# Patient Record
Sex: FEMALE | Race: WHITE | Employment: OTHER | URBAN - METROPOLITAN AREA
[De-identification: names, ages, dates, MRNs, and addresses within clinical notes are randomized per-mention and may not be internally consistent; named-entity substitution may affect disease eponyms.]

---

## 2017-03-20 LAB
AVERAGE GLUCOSE: NORMAL
HBA1C MFR BLD: 6.1 %

## 2017-07-19 LAB
AVERAGE GLUCOSE: NORMAL
HBA1C MFR BLD: 6.3 %

## 2018-01-01 ENCOUNTER — OFFICE VISIT (OUTPATIENT)
Dept: PRIMARY CARE CLINIC | Age: 83
End: 2018-01-01
Payer: MEDICARE

## 2018-01-01 ENCOUNTER — NURSE ONLY (OUTPATIENT)
Dept: PRIMARY CARE CLINIC | Age: 83
End: 2018-01-01

## 2018-01-01 ENCOUNTER — CARE COORDINATION (OUTPATIENT)
Dept: CARE COORDINATION | Age: 83
End: 2018-01-01

## 2018-01-01 ENCOUNTER — TELEPHONE (OUTPATIENT)
Dept: PRIMARY CARE CLINIC | Age: 83
End: 2018-01-01

## 2018-01-01 ENCOUNTER — CARE COORDINATION (OUTPATIENT)
Dept: CASE MANAGEMENT | Age: 83
End: 2018-01-01

## 2018-01-01 ENCOUNTER — HOSPITAL ENCOUNTER (OUTPATIENT)
Age: 83
Discharge: HOME | End: 2018-10-05
Payer: MEDICARE

## 2018-01-01 ENCOUNTER — HOSPITAL ENCOUNTER (INPATIENT)
Age: 83
LOS: 2 days | Discharge: HOME | DRG: 189 | End: 2018-12-14
Attending: EMERGENCY MEDICINE | Admitting: INTERNAL MEDICINE
Payer: MEDICARE

## 2018-01-01 ENCOUNTER — HOSPITAL ENCOUNTER (OUTPATIENT)
Age: 83
Discharge: HOME | End: 2018-03-29
Payer: MEDICARE

## 2018-01-01 ENCOUNTER — HOSPITAL ENCOUNTER (OUTPATIENT)
Age: 83
Discharge: HOME | End: 2018-09-29
Payer: MEDICARE

## 2018-01-01 ENCOUNTER — APPOINTMENT (OUTPATIENT)
Dept: GENERAL RADIOLOGY | Age: 83
DRG: 189 | End: 2018-01-01
Payer: MEDICARE

## 2018-01-01 VITALS
WEIGHT: 97 LBS | HEIGHT: 58 IN | DIASTOLIC BLOOD PRESSURE: 60 MMHG | HEART RATE: 88 BPM | BODY MASS INDEX: 20.36 KG/M2 | RESPIRATION RATE: 16 BRPM | OXYGEN SATURATION: 98 % | TEMPERATURE: 97 F | SYSTOLIC BLOOD PRESSURE: 114 MMHG

## 2018-01-01 VITALS
TEMPERATURE: 97.4 F | DIASTOLIC BLOOD PRESSURE: 44 MMHG | WEIGHT: 102 LBS | RESPIRATION RATE: 18 BRPM | HEIGHT: 62 IN | SYSTOLIC BLOOD PRESSURE: 93 MMHG | HEART RATE: 90 BPM | OXYGEN SATURATION: 96 % | BODY MASS INDEX: 18.77 KG/M2

## 2018-01-01 VITALS
BODY MASS INDEX: 19.96 KG/M2 | TEMPERATURE: 98.2 F | DIASTOLIC BLOOD PRESSURE: 52 MMHG | WEIGHT: 99 LBS | HEIGHT: 59 IN | OXYGEN SATURATION: 89 % | HEART RATE: 75 BPM | SYSTOLIC BLOOD PRESSURE: 110 MMHG | RESPIRATION RATE: 18 BRPM

## 2018-01-01 DIAGNOSIS — E78.49 OTHER HYPERLIPIDEMIA: ICD-10-CM

## 2018-01-01 DIAGNOSIS — R53.83 FATIGUE, UNSPECIFIED TYPE: Primary | ICD-10-CM

## 2018-01-01 DIAGNOSIS — I25.10 ATHEROSCLEROSIS OF NATIVE CORONARY ARTERY WITHOUT ANGINA PECTORIS, UNSPECIFIED WHETHER NATIVE OR TRANSPLANTED HEART: ICD-10-CM

## 2018-01-01 DIAGNOSIS — D63.1 ANEMIA IN STAGE 3 CHRONIC KIDNEY DISEASE (HCC): ICD-10-CM

## 2018-01-01 DIAGNOSIS — E55.9 VITAMIN D DEFICIENCY: ICD-10-CM

## 2018-01-01 DIAGNOSIS — N18.30 ANEMIA IN STAGE 3 CHRONIC KIDNEY DISEASE (HCC): ICD-10-CM

## 2018-01-01 DIAGNOSIS — R60.0 BILATERAL LOWER EXTREMITY EDEMA: ICD-10-CM

## 2018-01-01 DIAGNOSIS — J98.4 CHRONIC LUNG DISEASE: ICD-10-CM

## 2018-01-01 DIAGNOSIS — R53.82 CHRONIC FATIGUE: ICD-10-CM

## 2018-01-01 DIAGNOSIS — D69.6 DECREASED PLATELET COUNT (HCC): ICD-10-CM

## 2018-01-01 DIAGNOSIS — M89.9 DISORDER OF BONE: ICD-10-CM

## 2018-01-01 DIAGNOSIS — J96.01 ACUTE RESPIRATORY FAILURE WITH HYPOXIA (HCC): ICD-10-CM

## 2018-01-01 DIAGNOSIS — R53.83 FATIGUE, UNSPECIFIED TYPE: ICD-10-CM

## 2018-01-01 DIAGNOSIS — I50.32 CHRONIC DIASTOLIC CHF (CONGESTIVE HEART FAILURE) (HCC): Primary | ICD-10-CM

## 2018-01-01 DIAGNOSIS — Z79.899 HIGH RISK MEDICATION USE: ICD-10-CM

## 2018-01-01 DIAGNOSIS — I35.0 NONRHEUMATIC AORTIC VALVE STENOSIS: Primary | ICD-10-CM

## 2018-01-01 DIAGNOSIS — E55.9 VITAMIN D DEFICIENCY, UNSPECIFIED: ICD-10-CM

## 2018-01-01 DIAGNOSIS — I50.33 ACUTE ON CHRONIC DIASTOLIC CONGESTIVE HEART FAILURE (HCC): Primary | ICD-10-CM

## 2018-01-01 DIAGNOSIS — I35.0 NONRHEUMATIC AORTIC VALVE STENOSIS: ICD-10-CM

## 2018-01-01 DIAGNOSIS — R73.09 ELEVATED HEMOGLOBIN A1C: ICD-10-CM

## 2018-01-01 DIAGNOSIS — D51.0 PERNICIOUS ANEMIA: ICD-10-CM

## 2018-01-01 DIAGNOSIS — Z71.85 IMMUNIZATION COUNSELING: ICD-10-CM

## 2018-01-01 DIAGNOSIS — R06.09 DYSPNEA ON EXERTION: ICD-10-CM

## 2018-01-01 DIAGNOSIS — I50.32 CHRONIC DIASTOLIC CHF (CONGESTIVE HEART FAILURE) (HCC): ICD-10-CM

## 2018-01-01 DIAGNOSIS — I35.0 AORTIC VALVE STENOSIS, ETIOLOGY OF CARDIAC VALVE DISEASE UNSPECIFIED: ICD-10-CM

## 2018-01-01 DIAGNOSIS — E11.8 TYPE 2 DIABETES MELLITUS WITH COMPLICATION, UNSPECIFIED LONG TERM INSULIN USE STATUS: ICD-10-CM

## 2018-01-01 DIAGNOSIS — Z23 NEED FOR PROPHYLACTIC VACCINATION AGAINST STREPTOCOCCUS PNEUMONIAE (PNEUMOCOCCUS): Primary | ICD-10-CM

## 2018-01-01 DIAGNOSIS — I27.20 PULMONARY HYPERTENSION (HCC): ICD-10-CM

## 2018-01-01 DIAGNOSIS — S81.812A NONINFECTED SKIN TEAR OF LEFT LOWER EXTREMITY, INITIAL ENCOUNTER: ICD-10-CM

## 2018-01-01 LAB
ALBUMIN SERPL-MCNC: 3.5 G/DL (ref 3.5–5.2)
ALBUMIN SERPL-MCNC: 4 G/DL (ref 3.5–5.2)
ALBUMIN SERPL-MCNC: 4.3 G/DL (ref 3.5–5.2)
ALBUMIN SERPL-MCNC: 4.3 G/DL (ref 3.5–5.2)
ALP BLD-CCNC: 56 U/L (ref 35–104)
ALP BLD-CCNC: 58 U/L (ref 35–104)
ALP BLD-CCNC: 64 U/L (ref 35–104)
ALP BLD-CCNC: 65 U/L (ref 35–104)
ALT SERPL-CCNC: 6 U/L (ref 0–32)
ALT SERPL-CCNC: 6 U/L (ref 0–32)
ALT SERPL-CCNC: 8 U/L (ref 0–32)
ALT SERPL-CCNC: 9 U/L (ref 0–32)
ANION GAP SERPL CALCULATED.3IONS-SCNC: 15 MMOL/L (ref 7–16)
ANION GAP SERPL CALCULATED.3IONS-SCNC: 15 MMOL/L (ref 7–16)
ANION GAP SERPL CALCULATED.3IONS-SCNC: 3 MMOL/L (ref 7–16)
ANION GAP SERPL CALCULATED.3IONS-SCNC: 7 MMOL/L (ref 7–16)
ANISOCYTOSIS: ABNORMAL
AST SERPL-CCNC: 15 U/L (ref 0–31)
AST SERPL-CCNC: 16 U/L (ref 0–31)
AST SERPL-CCNC: 17 U/L (ref 0–31)
AST SERPL-CCNC: 24 U/L (ref 0–31)
B.E.: 12.3 MMOL/L (ref -3–3)
BACTERIA: ABNORMAL /HPF
BACTERIA: ABNORMAL /HPF
BASOPHILIC STIPPLING: ABNORMAL
BASOPHILS ABSOLUTE: 0 E9/L (ref 0–0.2)
BASOPHILS ABSOLUTE: 0.02 E9/L (ref 0–0.2)
BASOPHILS ABSOLUTE: 0.02 E9/L (ref 0–0.2)
BASOPHILS RELATIVE PERCENT: 0 % (ref 0–2)
BASOPHILS RELATIVE PERCENT: 0.2 % (ref 0–2)
BASOPHILS RELATIVE PERCENT: 0.3 % (ref 0–2)
BILIRUB SERPL-MCNC: 0.5 MG/DL (ref 0–1.2)
BILIRUB SERPL-MCNC: 0.5 MG/DL (ref 0–1.2)
BILIRUB SERPL-MCNC: 0.6 MG/DL (ref 0–1.2)
BILIRUB SERPL-MCNC: 0.7 MG/DL (ref 0–1.2)
BILIRUBIN URINE: NEGATIVE
BILIRUBIN URINE: NEGATIVE
BLOOD, URINE: NEGATIVE
BLOOD, URINE: NEGATIVE
BUN BLDV-MCNC: 23 MG/DL (ref 8–23)
BUN BLDV-MCNC: 25 MG/DL (ref 8–23)
BUN BLDV-MCNC: 26 MG/DL (ref 8–23)
BUN BLDV-MCNC: 27 MG/DL (ref 8–23)
CALCIUM SERPL-MCNC: 8 MG/DL (ref 8.6–10.2)
CALCIUM SERPL-MCNC: 8.7 MG/DL (ref 8.6–10.2)
CALCIUM SERPL-MCNC: 8.7 MG/DL (ref 8.6–10.2)
CALCIUM SERPL-MCNC: 9 MG/DL (ref 8.6–10.2)
CHLORIDE BLD-SCNC: 93 MMOL/L (ref 98–107)
CHLORIDE BLD-SCNC: 93 MMOL/L (ref 98–107)
CHLORIDE BLD-SCNC: 95 MMOL/L (ref 98–107)
CHLORIDE BLD-SCNC: 96 MMOL/L (ref 98–107)
CHOLESTEROL, TOTAL: 133 MG/DL (ref 0–199)
CHOLESTEROL, TOTAL: 171 MG/DL (ref 0–199)
CHOLESTEROL, TOTAL: 177 MG/DL (ref 0–199)
CLARITY: CLEAR
CLARITY: CLEAR
CO2: 33 MMOL/L (ref 22–29)
CO2: 34 MMOL/L (ref 22–29)
CO2: 40 MMOL/L (ref 22–29)
CO2: 41 MMOL/L (ref 22–29)
COHB: 1.9 % (ref 0–1.5)
COLOR: ABNORMAL
COLOR: YELLOW
CREAT SERPL-MCNC: 0.8 MG/DL (ref 0.5–1)
CREAT SERPL-MCNC: 0.8 MG/DL (ref 0.5–1)
CREAT SERPL-MCNC: 0.9 MG/DL (ref 0.5–1)
CREAT SERPL-MCNC: 1 MG/DL (ref 0.5–1)
CRITICAL: ABNORMAL
D DIMER: 202 NG/ML DDU
DATE ANALYZED: ABNORMAL
DATE OF COLLECTION: ABNORMAL
EOSINOPHILS ABSOLUTE: 0.08 E9/L (ref 0.05–0.5)
EOSINOPHILS ABSOLUTE: 0.11 E9/L (ref 0.05–0.5)
EOSINOPHILS ABSOLUTE: 0.12 E9/L (ref 0.05–0.5)
EOSINOPHILS RELATIVE PERCENT: 1 % (ref 0–6)
EOSINOPHILS RELATIVE PERCENT: 1.5 % (ref 0–6)
EOSINOPHILS RELATIVE PERCENT: 1.7 % (ref 0–6)
EPITHELIAL CELLS, UA: ABNORMAL /HPF
FILM ARRAY ADENOVIRUS: NORMAL
FILM ARRAY BORDETELLA PERTUSSIS: NORMAL
FILM ARRAY CHLAMYDOPHILIA PNEUMONIAE: NORMAL
FILM ARRAY CORONAVIRUS 229E: NORMAL
FILM ARRAY CORONAVIRUS HKU1: NORMAL
FILM ARRAY CORONAVIRUS NL63: NORMAL
FILM ARRAY CORONAVIRUS OC43: NORMAL
FILM ARRAY INFLUENZA A VIRUS 09H1: NORMAL
FILM ARRAY INFLUENZA A VIRUS H1: NORMAL
FILM ARRAY INFLUENZA A VIRUS H3: NORMAL
FILM ARRAY INFLUENZA A VIRUS: NORMAL
FILM ARRAY INFLUENZA B: NORMAL
FILM ARRAY METAPNEUMOVIRUS: NORMAL
FILM ARRAY MYCOPLASMA PNEUMONIAE: NORMAL
FILM ARRAY PARAINFLUENZA VIRUS 1: NORMAL
FILM ARRAY PARAINFLUENZA VIRUS 2: NORMAL
FILM ARRAY PARAINFLUENZA VIRUS 3: NORMAL
FILM ARRAY PARAINFLUENZA VIRUS 4: NORMAL
FILM ARRAY RESPIRATORY SYNCITIAL VIRUS: NORMAL
FILM ARRAY RHINOVIRUS/ENTEROVIRUS: NORMAL
FOLATE: 18.2 NG/ML (ref 4.8–24.2)
GFR AFRICAN AMERICAN: >60
GFR NON-AFRICAN AMERICAN: 52 ML/MIN/1.73
GFR NON-AFRICAN AMERICAN: 59 ML/MIN/1.73
GFR NON-AFRICAN AMERICAN: >60 ML/MIN/1.73
GFR NON-AFRICAN AMERICAN: >60 ML/MIN/1.73
GLUCOSE BLD-MCNC: 182 MG/DL (ref 74–109)
GLUCOSE BLD-MCNC: 288 MG/DL (ref 74–109)
GLUCOSE BLD-MCNC: 54 MG/DL (ref 74–99)
GLUCOSE BLD-MCNC: 61 MG/DL (ref 74–99)
GLUCOSE URINE: NEGATIVE MG/DL
GLUCOSE URINE: NEGATIVE MG/DL
HBA1C MFR BLD: 5 % (ref 4–5.6)
HBA1C MFR BLD: 6 % (ref 4–5.6)
HBA1C MFR BLD: 6.1 % (ref 4.8–5.9)
HCO3: 40.4 MMOL/L (ref 22–26)
HCT VFR BLD CALC: 30.7 % (ref 34–48)
HCT VFR BLD CALC: 32.9 % (ref 34–48)
HCT VFR BLD CALC: 35.3 % (ref 34–48)
HCT VFR BLD CALC: 36.1 % (ref 34–48)
HDLC SERPL-MCNC: 40 MG/DL
HDLC SERPL-MCNC: 48 MG/DL
HDLC SERPL-MCNC: 53 MG/DL
HEMOGLOBIN: 10 G/DL (ref 11.5–15.5)
HEMOGLOBIN: 8.3 G/DL (ref 11.5–15.5)
HEMOGLOBIN: 8.9 G/DL (ref 11.5–15.5)
HEMOGLOBIN: 9.6 G/DL (ref 11.5–15.5)
HHB: 11 % (ref 0–5)
HYPOCHROMIA: ABNORMAL
HYPOCHROMIA: ABNORMAL
IMMATURE GRANULOCYTES #: 0.04 E9/L
IMMATURE GRANULOCYTES #: 0.06 E9/L
IMMATURE GRANULOCYTES %: 0.6 % (ref 0–5)
IMMATURE GRANULOCYTES %: 0.7 % (ref 0–5)
KETONES, URINE: NEGATIVE MG/DL
KETONES, URINE: NEGATIVE MG/DL
LAB: ABNORMAL
LACTIC ACID, SEPSIS: 1.8 MMOL/L (ref 0.5–1.9)
LACTIC ACID: 0.7 MMOL/L (ref 0.5–2.2)
LDL CHOLESTEROL CALCULATED: 100 MG/DL (ref 0–99)
LDL CHOLESTEROL CALCULATED: 106 MG/DL (ref 0–99)
LDL CHOLESTEROL CALCULATED: 80 MG/DL (ref 0–99)
LEUKOCYTE ESTERASE, URINE: ABNORMAL
LEUKOCYTE ESTERASE, URINE: NEGATIVE
LV EF: 59 %
LVEF MODALITY: NORMAL
LYMPHOCYTES ABSOLUTE: 2.47 E9/L (ref 1.5–4)
LYMPHOCYTES ABSOLUTE: 2.53 E9/L (ref 1.5–4)
LYMPHOCYTES ABSOLUTE: 2.67 E9/L (ref 1.5–4)
LYMPHOCYTES RELATIVE PERCENT: 31.4 % (ref 20–42)
LYMPHOCYTES RELATIVE PERCENT: 33 % (ref 20–42)
LYMPHOCYTES RELATIVE PERCENT: 39.1 % (ref 20–42)
Lab: ABNORMAL
MAGNESIUM: 2.4 MG/DL (ref 1.6–2.6)
MCH RBC QN AUTO: 18.1 PG (ref 26–35)
MCH RBC QN AUTO: 18.3 PG (ref 26–35)
MCH RBC QN AUTO: 18.7 PG (ref 26–35)
MCH RBC QN AUTO: 18.7 PG (ref 26–35)
MCHC RBC AUTO-ENTMCNC: 27 % (ref 32–34.5)
MCHC RBC AUTO-ENTMCNC: 27.1 % (ref 32–34.5)
MCHC RBC AUTO-ENTMCNC: 27.2 % (ref 32–34.5)
MCHC RBC AUTO-ENTMCNC: 27.7 % (ref 32–34.5)
MCV RBC AUTO: 66 FL (ref 80–99.9)
MCV RBC AUTO: 66.5 FL (ref 80–99.9)
MCV RBC AUTO: 69 FL (ref 80–99.9)
MCV RBC AUTO: 69.1 FL (ref 80–99.9)
METER GLUCOSE: 117 MG/DL (ref 74–99)
METER GLUCOSE: 122 MG/DL (ref 74–99)
METER GLUCOSE: 145 MG/DL (ref 74–99)
METER GLUCOSE: 202 MG/DL (ref 74–99)
METER GLUCOSE: 211 MG/DL (ref 74–99)
METER GLUCOSE: 220 MG/DL (ref 74–99)
METER GLUCOSE: 73 MG/DL (ref 74–99)
METER GLUCOSE: 82 MG/DL (ref 74–99)
METER GLUCOSE: 87 MG/DL (ref 74–99)
METHB: 0.2 % (ref 0–1.5)
MODE: ABNORMAL
MONOCYTES ABSOLUTE: 0.45 E9/L (ref 0.1–0.95)
MONOCYTES ABSOLUTE: 0.45 E9/L (ref 0.1–0.95)
MONOCYTES ABSOLUTE: 0.49 E9/L (ref 0.1–0.95)
MONOCYTES RELATIVE PERCENT: 5.6 % (ref 2–12)
MONOCYTES RELATIVE PERCENT: 6 % (ref 2–12)
MONOCYTES RELATIVE PERCENT: 7.1 % (ref 2–12)
NEUTROPHILS ABSOLUTE: 3.23 E9/L (ref 1.8–7.3)
NEUTROPHILS ABSOLUTE: 4.86 E9/L (ref 1.8–7.3)
NEUTROPHILS ABSOLUTE: 4.89 E9/L (ref 1.8–7.3)
NEUTROPHILS RELATIVE PERCENT: 51.2 % (ref 43–80)
NEUTROPHILS RELATIVE PERCENT: 60 % (ref 43–80)
NEUTROPHILS RELATIVE PERCENT: 60.6 % (ref 43–80)
NITRITE, URINE: NEGATIVE
NITRITE, URINE: NEGATIVE
O2 CONTENT: 12.4 ML/DL
O2 SATURATION: 88.8 % (ref 92–98.5)
O2HB: 86.9 % (ref 94–97)
OPERATOR ID: 5721
ORGANISM: ABNORMAL
ORGANISM: ABNORMAL
OVALOCYTES: ABNORMAL
PATIENT TEMP: 37 C
PCO2: 76.6 MMHG (ref 35–45)
PDW BLD-RTO: 16.3 FL (ref 11.5–15)
PDW BLD-RTO: 16.3 FL (ref 11.5–15)
PDW BLD-RTO: 16.9 FL (ref 11.5–15)
PDW BLD-RTO: 17.1 FL (ref 11.5–15)
PH BLOOD GAS: 7.34 (ref 7.35–7.45)
PH UA: 6 (ref 5–9)
PH UA: 8 (ref 5–9)
PHOSPHORUS: 3.1 MG/DL (ref 2.5–4.5)
PLATELET # BLD: 72 E9/L (ref 130–450)
PLATELET # BLD: 75 E9/L (ref 130–450)
PLATELET # BLD: 77 E9/L (ref 130–450)
PLATELET # BLD: 99 E9/L (ref 130–450)
PLATELET CONFIRMATION: NORMAL
PMV BLD AUTO: 10.2 FL (ref 7–12)
PMV BLD AUTO: 9.4 FL (ref 7–12)
PMV BLD AUTO: ABNORMAL FL (ref 7–12)
PMV BLD AUTO: ABNORMAL FL (ref 7–12)
PO2: 57.7 MMHG (ref 60–100)
POIKILOCYTES: ABNORMAL
POLYCHROMASIA: ABNORMAL
POLYCHROMASIA: ABNORMAL
POTASSIUM SERPL-SCNC: 4.3 MMOL/L (ref 3.5–5)
POTASSIUM SERPL-SCNC: 4.6 MMOL/L (ref 3.5–5)
POTASSIUM SERPL-SCNC: 4.7 MMOL/L (ref 3.5–5)
POTASSIUM SERPL-SCNC: 5.2 MMOL/L (ref 3.5–5)
PRO-BNP: 2755 PG/ML (ref 0–450)
PRO-BNP: 3550 PG/ML (ref 0–450)
PROCALCITONIN: 0.3 NG/ML (ref 0–0.08)
PROTEIN UA: NEGATIVE MG/DL
PROTEIN UA: NEGATIVE MG/DL
RBC # BLD: 4.45 E12/L (ref 3.5–5.5)
RBC # BLD: 4.76 E12/L (ref 3.5–5.5)
RBC # BLD: 5.31 E12/L (ref 3.5–5.5)
RBC # BLD: 5.47 E12/L (ref 3.5–5.5)
RBC UA: ABNORMAL /HPF (ref 0–2)
RBC UA: ABNORMAL /HPF (ref 0–2)
SODIUM BLD-SCNC: 140 MMOL/L (ref 132–146)
SODIUM BLD-SCNC: 141 MMOL/L (ref 132–146)
SODIUM BLD-SCNC: 142 MMOL/L (ref 132–146)
SODIUM BLD-SCNC: 142 MMOL/L (ref 132–146)
SOURCE, BLOOD GAS: ABNORMAL
SPECIFIC GRAVITY UA: 1.01 (ref 1–1.03)
SPECIFIC GRAVITY UA: <=1.005 (ref 1–1.03)
TARGET CELLS: ABNORMAL
TARGET CELLS: ABNORMAL
THB: 10.1 G/DL (ref 11.5–16.5)
TIME ANALYZED: 1207
TOTAL PROTEIN: 6.1 G/DL (ref 6.4–8.3)
TOTAL PROTEIN: 6.7 G/DL (ref 6.4–8.3)
TOTAL PROTEIN: 7.2 G/DL (ref 6.4–8.3)
TOTAL PROTEIN: 7.6 G/DL (ref 6.4–8.3)
TRIGL SERPL-MCNC: 115 MG/DL (ref 0–149)
TRIGL SERPL-MCNC: 66 MG/DL (ref 0–149)
TRIGL SERPL-MCNC: 89 MG/DL (ref 0–149)
TROPONIN: <0.01 NG/ML (ref 0–0.03)
TSH SERPL DL<=0.05 MIU/L-ACNC: 2.24 UIU/ML (ref 0.27–4.2)
TSH SERPL DL<=0.05 MIU/L-ACNC: 6.07 UIU/ML (ref 0.27–4.2)
TSH SERPL DL<=0.05 MIU/L-ACNC: 8.52 UIU/ML (ref 0.27–4.2)
URIC ACID, SERUM: 8.2 MG/DL (ref 2.4–5.7)
URIC ACID, SERUM: 9.1 MG/DL (ref 2.4–5.7)
URINE CULTURE, ROUTINE: ABNORMAL
UROBILINOGEN, URINE: 0.2 E.U./DL
UROBILINOGEN, URINE: 0.2 E.U./DL
VITAMIN B-12: 269 PG/ML (ref 211–946)
VITAMIN D 1,25-DIHYDROXY: 48.5 PG/ML (ref 19.9–79.3)
VITAMIN D 25-HYDROXY: 25 NG/ML (ref 30–100)
VLDLC SERPL CALC-MCNC: 13 MG/DL
VLDLC SERPL CALC-MCNC: 18 MG/DL
VLDLC SERPL CALC-MCNC: 23 MG/DL
WBC # BLD: 6.3 E9/L (ref 4.5–11.5)
WBC # BLD: 8.1 E9/L (ref 4.5–11.5)
WBC # BLD: 8.1 E9/L (ref 4.5–11.5)
WBC # BLD: 9.9 E9/L (ref 4.5–11.5)
WBC UA: ABNORMAL /HPF (ref 0–5)
WBC UA: ABNORMAL /HPF (ref 0–5)

## 2018-01-01 PROCEDURE — 2060000000 HC ICU INTERMEDIATE R&B

## 2018-01-01 PROCEDURE — 94761 N-INVAS EAR/PLS OXIMETRY MLT: CPT

## 2018-01-01 PROCEDURE — 83880 ASSAY OF NATRIURETIC PEPTIDE: CPT

## 2018-01-01 PROCEDURE — 6370000000 HC RX 637 (ALT 250 FOR IP): Performed by: INTERNAL MEDICINE

## 2018-01-01 PROCEDURE — 94640 AIRWAY INHALATION TREATMENT: CPT

## 2018-01-01 PROCEDURE — 80061 LIPID PANEL: CPT

## 2018-01-01 PROCEDURE — 84443 ASSAY THYROID STIM HORMONE: CPT

## 2018-01-01 PROCEDURE — 84550 ASSAY OF BLOOD/URIC ACID: CPT

## 2018-01-01 PROCEDURE — 82607 VITAMIN B-12: CPT

## 2018-01-01 PROCEDURE — 85025 COMPLETE CBC W/AUTO DIFF WBC: CPT

## 2018-01-01 PROCEDURE — 94664 DEMO&/EVAL PT USE INHALER: CPT

## 2018-01-01 PROCEDURE — 82962 GLUCOSE BLOOD TEST: CPT

## 2018-01-01 PROCEDURE — 87077 CULTURE AEROBIC IDENTIFY: CPT

## 2018-01-01 PROCEDURE — 99284 EMERGENCY DEPT VISIT MOD MDM: CPT

## 2018-01-01 PROCEDURE — 6360000002 HC RX W HCPCS: Performed by: INTERNAL MEDICINE

## 2018-01-01 PROCEDURE — 97535 SELF CARE MNGMENT TRAINING: CPT

## 2018-01-01 PROCEDURE — 2500000003 HC RX 250 WO HCPCS: Performed by: INTERNAL MEDICINE

## 2018-01-01 PROCEDURE — 99221 1ST HOSP IP/OBS SF/LOW 40: CPT | Performed by: EMERGENCY MEDICINE

## 2018-01-01 PROCEDURE — 87633 RESP VIRUS 12-25 TARGETS: CPT

## 2018-01-01 PROCEDURE — 87040 BLOOD CULTURE FOR BACTERIA: CPT

## 2018-01-01 PROCEDURE — 2700000000 HC OXYGEN THERAPY PER DAY

## 2018-01-01 PROCEDURE — 83036 HEMOGLOBIN GLYCOSYLATED A1C: CPT

## 2018-01-01 PROCEDURE — 99214 OFFICE O/P EST MOD 30 MIN: CPT | Performed by: INTERNAL MEDICINE

## 2018-01-01 PROCEDURE — 2580000003 HC RX 258: Performed by: EMERGENCY MEDICINE

## 2018-01-01 PROCEDURE — 84145 PROCALCITONIN (PCT): CPT

## 2018-01-01 PROCEDURE — 87798 DETECT AGENT NOS DNA AMP: CPT

## 2018-01-01 PROCEDURE — 97165 OT EVAL LOW COMPLEX 30 MIN: CPT

## 2018-01-01 PROCEDURE — 85378 FIBRIN DEGRADE SEMIQUANT: CPT

## 2018-01-01 PROCEDURE — 99223 1ST HOSP IP/OBS HIGH 75: CPT | Performed by: INTERNAL MEDICINE

## 2018-01-01 PROCEDURE — 87486 CHLMYD PNEUM DNA AMP PROBE: CPT

## 2018-01-01 PROCEDURE — 81001 URINALYSIS AUTO W/SCOPE: CPT

## 2018-01-01 PROCEDURE — 82652 VIT D 1 25-DIHYDROXY: CPT

## 2018-01-01 PROCEDURE — 84484 ASSAY OF TROPONIN QUANT: CPT

## 2018-01-01 PROCEDURE — 80053 COMPREHEN METABOLIC PANEL: CPT

## 2018-01-01 PROCEDURE — 82746 ASSAY OF FOLIC ACID SERUM: CPT

## 2018-01-01 PROCEDURE — 6360000002 HC RX W HCPCS: Performed by: EMERGENCY MEDICINE

## 2018-01-01 PROCEDURE — 82805 BLOOD GASES W/O2 SATURATION: CPT

## 2018-01-01 PROCEDURE — 87581 M.PNEUMON DNA AMP PROBE: CPT

## 2018-01-01 PROCEDURE — 85027 COMPLETE CBC AUTOMATED: CPT

## 2018-01-01 PROCEDURE — 99231 SBSQ HOSP IP/OBS SF/LOW 25: CPT | Performed by: INTERNAL MEDICINE

## 2018-01-01 PROCEDURE — 82306 VITAMIN D 25 HYDROXY: CPT

## 2018-01-01 PROCEDURE — 83605 ASSAY OF LACTIC ACID: CPT

## 2018-01-01 PROCEDURE — 87186 SC STD MICRODIL/AGAR DIL: CPT

## 2018-01-01 PROCEDURE — 97530 THERAPEUTIC ACTIVITIES: CPT

## 2018-01-01 PROCEDURE — 87088 URINE BACTERIA CULTURE: CPT

## 2018-01-01 PROCEDURE — 84100 ASSAY OF PHOSPHORUS: CPT

## 2018-01-01 PROCEDURE — 97161 PT EVAL LOW COMPLEX 20 MIN: CPT

## 2018-01-01 PROCEDURE — 93306 TTE W/DOPPLER COMPLETE: CPT

## 2018-01-01 PROCEDURE — 83735 ASSAY OF MAGNESIUM: CPT

## 2018-01-01 PROCEDURE — 71045 X-RAY EXAM CHEST 1 VIEW: CPT

## 2018-01-01 PROCEDURE — 36415 COLL VENOUS BLD VENIPUNCTURE: CPT

## 2018-01-01 PROCEDURE — 99233 SBSQ HOSP IP/OBS HIGH 50: CPT | Performed by: PHYSICIAN ASSISTANT

## 2018-01-01 PROCEDURE — 93005 ELECTROCARDIOGRAM TRACING: CPT

## 2018-01-01 RX ORDER — FUROSEMIDE 40 MG/1
40 TABLET ORAL 2 TIMES DAILY
Qty: 180 TABLET | Refills: 3 | Status: ON HOLD | OUTPATIENT
Start: 2018-01-01 | End: 2018-01-01 | Stop reason: HOSPADM

## 2018-01-01 RX ORDER — ONDANSETRON 2 MG/ML
4 INJECTION INTRAMUSCULAR; INTRAVENOUS EVERY 6 HOURS PRN
Status: DISCONTINUED | OUTPATIENT
Start: 2018-01-01 | End: 2018-01-01 | Stop reason: HOSPADM

## 2018-01-01 RX ORDER — IPRATROPIUM BROMIDE AND ALBUTEROL SULFATE 2.5; .5 MG/3ML; MG/3ML
SOLUTION RESPIRATORY (INHALATION)
Qty: 810 ML | Refills: 1 | Status: SHIPPED | OUTPATIENT
Start: 2018-01-01 | End: 2018-01-01 | Stop reason: ALTCHOICE

## 2018-01-01 RX ORDER — SODIUM CHLORIDE 0.9 % (FLUSH) 0.9 %
10 SYRINGE (ML) INJECTION PRN
Status: DISCONTINUED | OUTPATIENT
Start: 2018-01-01 | End: 2018-01-01 | Stop reason: HOSPADM

## 2018-01-01 RX ORDER — BUMETANIDE 0.25 MG/ML
1 INJECTION, SOLUTION INTRAMUSCULAR; INTRAVENOUS DAILY
Status: DISCONTINUED | OUTPATIENT
Start: 2018-01-01 | End: 2018-01-01

## 2018-01-01 RX ORDER — NICOTINE POLACRILEX 4 MG
15 LOZENGE BUCCAL PRN
Status: DISCONTINUED | OUTPATIENT
Start: 2018-01-01 | End: 2018-01-01 | Stop reason: HOSPADM

## 2018-01-01 RX ORDER — POTASSIUM CHLORIDE 750 MG/1
10 CAPSULE, EXTENDED RELEASE ORAL EVERY MORNING
Status: DISCONTINUED | OUTPATIENT
Start: 2018-01-01 | End: 2018-01-01 | Stop reason: CLARIF

## 2018-01-01 RX ORDER — DEXTROSE MONOHYDRATE 50 MG/ML
100 INJECTION, SOLUTION INTRAVENOUS PRN
Status: DISCONTINUED | OUTPATIENT
Start: 2018-01-01 | End: 2018-01-01 | Stop reason: HOSPADM

## 2018-01-01 RX ORDER — BUMETANIDE 1 MG/1
1 TABLET ORAL DAILY
Qty: 30 TABLET | Refills: 3 | Status: SHIPPED | OUTPATIENT
Start: 2018-01-01

## 2018-01-01 RX ORDER — POTASSIUM CHLORIDE 750 MG/1
10 CAPSULE, EXTENDED RELEASE ORAL DAILY
Qty: 90 CAPSULE | Refills: 3 | Status: SHIPPED | OUTPATIENT
Start: 2018-01-01

## 2018-01-01 RX ORDER — GLIPIZIDE 5 MG/1
TABLET ORAL
Qty: 135 TABLET | Refills: 3 | Status: SHIPPED | OUTPATIENT
Start: 2018-01-01

## 2018-01-01 RX ORDER — NITROGLYCERIN 0.4 MG/1
0.4 TABLET SUBLINGUAL EVERY 5 MIN PRN
Status: DISCONTINUED | OUTPATIENT
Start: 2018-01-01 | End: 2018-01-01 | Stop reason: HOSPADM

## 2018-01-01 RX ORDER — NICOTINE POLACRILEX 4 MG
15 LOZENGE BUCCAL PRN
Status: DISCONTINUED | OUTPATIENT
Start: 2018-01-01 | End: 2018-01-01 | Stop reason: SDUPTHER

## 2018-01-01 RX ORDER — BUDESONIDE 0.25 MG/2ML
250 INHALANT ORAL 2 TIMES DAILY
Status: DISCONTINUED | OUTPATIENT
Start: 2018-01-01 | End: 2018-01-01 | Stop reason: HOSPADM

## 2018-01-01 RX ORDER — SPIRONOLACTONE 25 MG/1
12.5 TABLET ORAL DAILY
Qty: 30 TABLET | Refills: 3 | Status: SHIPPED | OUTPATIENT
Start: 2018-01-01

## 2018-01-01 RX ORDER — BUMETANIDE 0.25 MG/ML
1 INJECTION, SOLUTION INTRAMUSCULAR; INTRAVENOUS EVERY 12 HOURS
Status: DISCONTINUED | OUTPATIENT
Start: 2018-01-01 | End: 2018-01-01

## 2018-01-01 RX ORDER — SPIRONOLACTONE 25 MG/1
12.5 TABLET ORAL DAILY
Status: DISCONTINUED | OUTPATIENT
Start: 2018-01-01 | End: 2018-01-01 | Stop reason: HOSPADM

## 2018-01-01 RX ORDER — CEPHALEXIN 250 MG/1
250 CAPSULE ORAL 3 TIMES DAILY
Qty: 30 CAPSULE | Refills: 0 | Status: SHIPPED | OUTPATIENT
Start: 2018-01-01

## 2018-01-01 RX ORDER — GLIPIZIDE 5 MG/1
2.5 TABLET ORAL EVERY EVENING
Status: DISCONTINUED | OUTPATIENT
Start: 2018-01-01 | End: 2018-01-01 | Stop reason: HOSPADM

## 2018-01-01 RX ORDER — NITROGLYCERIN 0.4 MG/1
TABLET SUBLINGUAL
Qty: 25 TABLET | Refills: 3 | Status: SHIPPED | OUTPATIENT
Start: 2018-01-01

## 2018-01-01 RX ORDER — IPRATROPIUM BROMIDE AND ALBUTEROL SULFATE 2.5; .5 MG/3ML; MG/3ML
1 SOLUTION RESPIRATORY (INHALATION)
Status: DISCONTINUED | OUTPATIENT
Start: 2018-01-01 | End: 2018-01-01 | Stop reason: HOSPADM

## 2018-01-01 RX ORDER — GLIPIZIDE 5 MG/1
5 TABLET ORAL EVERY MORNING
Status: DISCONTINUED | OUTPATIENT
Start: 2018-01-01 | End: 2018-01-01 | Stop reason: HOSPADM

## 2018-01-01 RX ORDER — GLIPIZIDE 5 MG/1
2.5 TABLET ORAL EVERY EVENING
COMMUNITY

## 2018-01-01 RX ORDER — FORMOTEROL FUMARATE 20 UG/2ML
20 SOLUTION RESPIRATORY (INHALATION) 2 TIMES DAILY
Status: DISCONTINUED | OUTPATIENT
Start: 2018-01-01 | End: 2018-01-01 | Stop reason: HOSPADM

## 2018-01-01 RX ORDER — FUROSEMIDE 10 MG/ML
20 INJECTION INTRAMUSCULAR; INTRAVENOUS ONCE
Status: COMPLETED | OUTPATIENT
Start: 2018-01-01 | End: 2018-01-01

## 2018-01-01 RX ORDER — BUMETANIDE 1 MG/1
1 TABLET ORAL DAILY
Status: DISCONTINUED | OUTPATIENT
Start: 2018-01-01 | End: 2018-01-01 | Stop reason: HOSPADM

## 2018-01-01 RX ORDER — ACETAMINOPHEN 325 MG/1
650 TABLET ORAL EVERY 4 HOURS PRN
Status: DISCONTINUED | OUTPATIENT
Start: 2018-01-01 | End: 2018-01-01 | Stop reason: HOSPADM

## 2018-01-01 RX ORDER — DEXTROSE MONOHYDRATE 25 G/50ML
12.5 INJECTION, SOLUTION INTRAVENOUS PRN
Status: DISCONTINUED | OUTPATIENT
Start: 2018-01-01 | End: 2018-01-01 | Stop reason: HOSPADM

## 2018-01-01 RX ORDER — ASPIRIN 81 MG/1
81 TABLET, CHEWABLE ORAL DAILY
Status: DISCONTINUED | OUTPATIENT
Start: 2018-01-01 | End: 2018-01-01 | Stop reason: HOSPADM

## 2018-01-01 RX ORDER — PANTOPRAZOLE SODIUM 40 MG/1
40 TABLET, DELAYED RELEASE ORAL
Status: DISCONTINUED | OUTPATIENT
Start: 2018-01-01 | End: 2018-01-01 | Stop reason: HOSPADM

## 2018-01-01 RX ORDER — POTASSIUM CHLORIDE 750 MG/1
10 TABLET, EXTENDED RELEASE ORAL
Status: DISCONTINUED | OUTPATIENT
Start: 2018-01-01 | End: 2018-01-01 | Stop reason: HOSPADM

## 2018-01-01 RX ORDER — PANTOPRAZOLE SODIUM 40 MG/1
40 TABLET, DELAYED RELEASE ORAL
Qty: 30 TABLET | Refills: 3 | Status: SHIPPED | OUTPATIENT
Start: 2018-01-01

## 2018-01-01 RX ADMIN — METOPROLOL TARTRATE 12.5 MG: 25 TABLET ORAL at 08:18

## 2018-01-01 RX ADMIN — BUMETANIDE 1 MG: 0.25 INJECTION INTRAMUSCULAR; INTRAVENOUS at 17:00

## 2018-01-01 RX ADMIN — BUDESONIDE 250 MCG: 0.25 SUSPENSION RESPIRATORY (INHALATION) at 09:00

## 2018-01-01 RX ADMIN — POTASSIUM CHLORIDE 10 MEQ: 10 TABLET, EXTENDED RELEASE ORAL at 08:18

## 2018-01-01 RX ADMIN — FUROSEMIDE 20 MG: 10 INJECTION, SOLUTION INTRAVENOUS at 14:35

## 2018-01-01 RX ADMIN — IPRATROPIUM BROMIDE AND ALBUTEROL SULFATE 1 AMPULE: .5; 3 SOLUTION RESPIRATORY (INHALATION) at 21:03

## 2018-01-01 RX ADMIN — BUMETANIDE 1 MG: 0.25 INJECTION INTRAMUSCULAR; INTRAVENOUS at 08:51

## 2018-01-01 RX ADMIN — IPRATROPIUM BROMIDE AND ALBUTEROL SULFATE 1 AMPULE: .5; 3 SOLUTION RESPIRATORY (INHALATION) at 16:47

## 2018-01-01 RX ADMIN — BUDESONIDE 250 MCG: 0.25 SUSPENSION RESPIRATORY (INHALATION) at 08:45

## 2018-01-01 RX ADMIN — ENOXAPARIN SODIUM 30 MG: 100 INJECTION SUBCUTANEOUS at 17:09

## 2018-01-01 RX ADMIN — IPRATROPIUM BROMIDE AND ALBUTEROL SULFATE 1 AMPULE: .5; 3 SOLUTION RESPIRATORY (INHALATION) at 20:22

## 2018-01-01 RX ADMIN — PANTOPRAZOLE SODIUM 40 MG: 40 TABLET, DELAYED RELEASE ORAL at 05:57

## 2018-01-01 RX ADMIN — FORMOTEROL FUMARATE DIHYDRATE 20 MCG: 20 SOLUTION RESPIRATORY (INHALATION) at 08:59

## 2018-01-01 RX ADMIN — INSULIN LISPRO 2 UNITS: 100 INJECTION, SOLUTION INTRAVENOUS; SUBCUTANEOUS at 17:01

## 2018-01-01 RX ADMIN — INSULIN LISPRO 2 UNITS: 100 INJECTION, SOLUTION INTRAVENOUS; SUBCUTANEOUS at 11:26

## 2018-01-01 RX ADMIN — FORMOTEROL FUMARATE DIHYDRATE 20 MCG: 20 SOLUTION RESPIRATORY (INHALATION) at 08:45

## 2018-01-01 RX ADMIN — FORMOTEROL FUMARATE DIHYDRATE 20 MCG: 20 SOLUTION RESPIRATORY (INHALATION) at 20:22

## 2018-01-01 RX ADMIN — GLIPIZIDE 5 MG: 5 TABLET ORAL at 08:18

## 2018-01-01 RX ADMIN — POTASSIUM CHLORIDE 10 MEQ: 10 TABLET, EXTENDED RELEASE ORAL at 08:51

## 2018-01-01 RX ADMIN — GLIPIZIDE 2.5 MG: 5 TABLET ORAL at 17:08

## 2018-01-01 RX ADMIN — ASPIRIN 81 MG 81 MG: 81 TABLET ORAL at 18:10

## 2018-01-01 RX ADMIN — FORMOTEROL FUMARATE DIHYDRATE 20 MCG: 20 SOLUTION RESPIRATORY (INHALATION) at 21:03

## 2018-01-01 RX ADMIN — SPIRONOLACTONE 12.5 MG: 25 TABLET ORAL at 12:13

## 2018-01-01 RX ADMIN — ENOXAPARIN SODIUM 40 MG: 40 INJECTION SUBCUTANEOUS at 17:00

## 2018-01-01 RX ADMIN — GLIPIZIDE 2.5 MG: 5 TABLET ORAL at 18:10

## 2018-01-01 RX ADMIN — GLIPIZIDE 5 MG: 5 TABLET ORAL at 08:51

## 2018-01-01 RX ADMIN — IPRATROPIUM BROMIDE AND ALBUTEROL SULFATE 1 AMPULE: .5; 3 SOLUTION RESPIRATORY (INHALATION) at 08:45

## 2018-01-01 RX ADMIN — BUDESONIDE 250 MCG: 0.25 SUSPENSION RESPIRATORY (INHALATION) at 20:22

## 2018-01-01 RX ADMIN — ASPIRIN 81 MG 81 MG: 81 TABLET ORAL at 08:51

## 2018-01-01 RX ADMIN — PANTOPRAZOLE SODIUM 40 MG: 40 TABLET, DELAYED RELEASE ORAL at 06:11

## 2018-01-01 RX ADMIN — BUDESONIDE 250 MCG: 0.25 SUSPENSION RESPIRATORY (INHALATION) at 21:03

## 2018-01-01 RX ADMIN — IPRATROPIUM BROMIDE AND ALBUTEROL SULFATE 1 AMPULE: .5; 3 SOLUTION RESPIRATORY (INHALATION) at 16:22

## 2018-01-01 RX ADMIN — Medication 10 ML: at 05:57

## 2018-01-01 RX ADMIN — IPRATROPIUM BROMIDE AND ALBUTEROL SULFATE 1 AMPULE: .5; 3 SOLUTION RESPIRATORY (INHALATION) at 12:02

## 2018-01-01 RX ADMIN — ASPIRIN 81 MG 81 MG: 81 TABLET ORAL at 08:18

## 2018-01-01 RX ADMIN — INSULIN LISPRO 1 UNITS: 100 INJECTION, SOLUTION INTRAVENOUS; SUBCUTANEOUS at 21:04

## 2018-01-01 RX ADMIN — IPRATROPIUM BROMIDE AND ALBUTEROL SULFATE 1 AMPULE: .5; 3 SOLUTION RESPIRATORY (INHALATION) at 16:04

## 2018-01-01 RX ADMIN — BUMETANIDE 1 MG: 0.25 INJECTION INTRAMUSCULAR; INTRAVENOUS at 05:57

## 2018-01-01 ASSESSMENT — ENCOUNTER SYMPTOMS
EYE REDNESS: 0
NAUSEA: 0
COLOR CHANGE: 1
HEMOPTYSIS: 0
DYSPNEA ASSOCIATED WITH: EXERTION
BLURRED VISION: 0
DOUBLE VISION: 0
VOMITING: 0
SHORTNESS OF BREATH: 1
NAUSEA: 0
CHEST TIGHTNESS: 0
EYE PAIN: 0
DIARRHEA: 0
DIARRHEA: 0
HEMOPTYSIS: 0
SHORTNESS OF BREATH: 1
BLOOD IN STOOL: 0
BLOOD IN STOOL: 0
STRIDOR: 0
STRIDOR: 0
NAUSEA: 0
EYE REDNESS: 0
ABDOMINAL PAIN: 0
BLURRED VISION: 0
COUGH: 0
DOUBLE VISION: 0
BACK PAIN: 0
EYE PAIN: 0
BLOOD IN STOOL: 0
SHORTNESS OF BREATH: 1
DIARRHEA: 0

## 2018-01-01 ASSESSMENT — PATIENT HEALTH QUESTIONNAIRE - PHQ9
2. FEELING DOWN, DEPRESSED OR HOPELESS: 0
1. LITTLE INTEREST OR PLEASURE IN DOING THINGS: 0
SUM OF ALL RESPONSES TO PHQ9 QUESTIONS 1 & 2: 0
SUM OF ALL RESPONSES TO PHQ QUESTIONS 1-9: 0

## 2018-01-01 ASSESSMENT — PAIN SCALES - GENERAL
PAINLEVEL_OUTOF10: 0

## 2018-03-27 NOTE — PROGRESS NOTES
Normocephalic and atraumatic. Nose: Nose normal.   Eyes: EOM are normal. Pupils are equal, round, and reactive to light. Neck: Normal range of motion. Cardiovascular: Normal rate, regular rhythm and normal heart sounds. Murmur at the base of the heart, radiates to the carotid arteries consistent with severe aortic valve disease. Will follow up with cardiologist   Pulmonary/Chest: Effort normal.   Crackles at both lung bases. Abdominal: Soft. Bowel sounds are normal.   Musculoskeletal: Normal range of motion. Neurological: She is alert and oriented to person, place, and time. Skin: Skin is warm and dry. Psychiatric: She has a normal mood and affect. Vitals reviewed. Labs :    Lab Results   Component Value Date    WBC 8.1 11/21/2017    HGB 9.8 (L) 11/21/2017    HCT 33.9 (L) 11/21/2017    PLT 87 (L) 11/21/2017    ALT 6 11/21/2017    AST 16 11/21/2017     11/21/2017    K 5.6 (H) 11/21/2017    CL 96 (L) 11/21/2017    CREATININE 0.8 11/21/2017    BUN 25 (H) 11/21/2017    CO2 33 (H) 11/21/2017    TSH 4.910 (H) 11/21/2017    INR 1.1 07/24/2016    LABA1C 6.2 (H) 11/21/2017     Lab Results   Component Value Date    COLORU Yellow 11/21/2017    NITRU Negative 11/21/2017    GLUCOSEU 250 11/21/2017    KETUA Negative 11/21/2017    UROBILINOGEN 0.2 11/21/2017    BILIRUBINUR Negative 11/21/2017     No results found for: PSA, CEA, , [id number],           ASSESSMENT/PLAN   A complicated patient, the patient has end-stage lung disease and suffered severe respiratory distress with respiratory failure was on a ventilator for a long time in the hospital intensive care unit she had miraculous recovery. She's done well since that time. She sees pulmonary cardiology regularly. The patient still uses oxygen on a regular basis 24 7. Patient had laboratory studies done today which will be reviewed, patient will then be called.     Severity of the underlying pulmonary and cardiac lung

## 2018-03-29 NOTE — PROGRESS NOTES
Please advised the patient that her diagnostic laboratory studies done today she was here for the visit, have been reviewed and they're all exactly as they have been in the past. she needs to continue only her usual medications

## 2018-09-27 PROBLEM — R53.83 FATIGUE: Status: ACTIVE | Noted: 2018-01-01

## 2018-09-27 PROBLEM — E78.49 OTHER HYPERLIPIDEMIA: Status: ACTIVE | Noted: 2018-01-01

## 2018-09-27 PROBLEM — M89.9 DISORDER OF BONE: Status: ACTIVE | Noted: 2018-01-01

## 2018-09-27 PROBLEM — Z71.85 IMMUNIZATION COUNSELING: Status: ACTIVE | Noted: 2018-01-01

## 2018-09-27 PROBLEM — R73.09 ELEVATED HEMOGLOBIN A1C: Status: ACTIVE | Noted: 2018-01-01

## 2018-09-27 NOTE — PROGRESS NOTES
HPI:     Patient is a frail elderly 80-year-old female, here today to follow-up on a variety of complex chronic conditions including:  Chronic diastolic CHF  Chronic respiratory failure with pulmonary fibrosis  Anemia of chronic disease with thrombocytopenia and pancytopenia  Aortic stenosis  Hyperlipidemia  Type 2 diabetes        Review of Systems  Review of Systems   Constitutional: Positive for malaise/fatigue ( feels weak and tired much of the time secondary to anemia). Negative for chills and fever. HENT: Negative for congestion, ear discharge and ear pain. Eyes: Negative for blurred vision, double vision, pain and redness. Respiratory: Positive for shortness of breath ( short of breath with effort, uses oxygen 24 hours per day). Negative for hemoptysis and stridor. Cardiovascular: Positive for palpitations ( palpitations at times). Negative for chest pain, claudication, leg swelling and PND. Gastrointestinal: Negative for blood in stool, diarrhea and nausea. Genitourinary: Negative for dysuria, hematuria and urgency. Musculoskeletal: Negative for falls and joint pain. Skin: Negative for rash. Neurological: Negative for dizziness, focal weakness, seizures and loss of consciousness. Psychiatric/Behavioral: Negative for depression, hallucinations, memory loss and suicidal ideas. PE:  VS:  /60 (Site: Left Upper Arm, Position: Sitting, Cuff Size: Small Adult)   Pulse 88   Temp 97 °F (36.1 °C) (Tympanic)   Resp 16   Ht 4' 10\" (1.473 m)   Wt 97 lb (44 kg)   SpO2 98%   BMI 20.27 kg/m²   Physical Exam   Constitutional: She is oriented to person, place, and time. She appears well-developed and well-nourished. HENT:   Head: Normocephalic and atraumatic. Nose: Nose normal.   Eyes: Pupils are equal, round, and reactive to light. EOM are normal.   Neck: Normal range of motion. Cardiovascular: Normal rate and regular rhythm.     Murmur ( loud murmur base of the heart transmits) heard.  Pulmonary/Chest: Effort normal. She has rales ( crackles in both lung fields). Abdominal: Soft. Bowel sounds are normal.   Musculoskeletal: Normal range of motion. She exhibits edema ( +2 edema midway to the calf) and deformity ( Vahe joint disease of the knees). Neurological: She is alert and oriented to person, place, and time. Skin: Skin is warm and dry. Psychiatric: She has a normal mood and affect. Vitals reviewed. Assessment/Plan:  Julio Saunders was seen today for diabetes, hypertension and health maintenance. Patient is long overdue for a visit to cardiology and pulmonary medicine , we will make arrangements for her to see both Dr. Janis العلي,  Cardiology and Jackey Canavan, pulmonary. Laboratory studies done today will be reviewed patient will be contacted by phone    Discussed immunizations patient will go to pharmacy to have these done    See me in 6 months time with diagnostic lab    Plan;  1. Continue current medications  2. See cardiologist  3. See pulmonary medicine  4. Continue to use oxygen  5. Will contact patient with results of diagnostic tests done today  6. Patient will go to pharmacy for flu shot and Prevnar 13  Diagnoses and all orders for this visit:    Need for prophylactic vaccination against Streptococcus pneumoniae (pneumococcus);at pharmacy  -     Pneumococcal conjugate vaccine 13-valent PCV13    Chronic diastolic CHF (congestive heart failure) (HCC);see cardiology  -     Comprehensive Metabolic Panel; Future  -     Uric Acid; Future  -     Urinalysis;  Future  -     [facility]- Reed Ellsworth MD    Acute respiratory failure with hypoxia (HCC);C pulmonary medicine  -     [facility]- Elsi Juarez MD ([facility])    Nonrheumatic aortic valve stenosis;continue same meds he cardiology  -     [facility]- Reed Ellsworth MD    Anemia in stage 3 chronic kidney disease (HCC);continue to monitor labs

## 2018-12-10 NOTE — TELEPHONE ENCOUNTER
Patient has had swelling in lower extremities from knees to toes x 6 days. She cannot wear shoes. There is no pain or redness and she is able to walk normally. She states she is taking Furosemide 40 mg BID.   Please advise thank you

## 2018-12-12 PROBLEM — C91.10 CHRONIC LYMPHOCYTIC LEUKEMIA (HCC): Status: ACTIVE | Noted: 2018-01-01

## 2018-12-12 PROBLEM — D69.6 THROMBOCYTOPENIA (HCC): Status: ACTIVE | Noted: 2018-01-01

## 2018-12-12 PROBLEM — E11.9 DIABETES (HCC): Status: ACTIVE | Noted: 2018-01-01

## 2018-12-12 PROBLEM — J84.9 INTERSTITIAL LUNG DISEASE (HCC): Status: ACTIVE | Noted: 2018-01-01

## 2018-12-12 PROBLEM — J96.21 ACUTE ON CHRONIC RESPIRATORY FAILURE WITH HYPOXIA (HCC): Status: ACTIVE | Noted: 2018-01-01

## 2018-12-12 PROBLEM — D46.9 MYELODYSPLASTIC SYNDROME (HCC): Status: ACTIVE | Noted: 2018-01-01

## 2018-12-12 NOTE — ED PROVIDER NOTES
Negative mg/dL    Urobilinogen, Urine 0.2 <2.0 E.U./dL    Nitrite, Urine Negative Negative    Leukocyte Esterase, Urine Negative Negative    WBC, UA 0-1 0 - 5 /HPF    RBC, UA NONE 0 - 2 /HPF    Epi Cells FEW /HPF    Bacteria, UA FEW (A) /HPF   Troponin   Result Value Ref Range    Troponin <0.01 0.00 - 0.03 ng/mL   Brain Natriuretic Peptide   Result Value Ref Range    Pro-BNP 2,755 (H) 0 - 450 pg/mL   Lactic Acid, Plasma   Result Value Ref Range    Lactic Acid 0.7 0.5 - 2.2 mmol/L   Platelet Confirmation   Result Value Ref Range    Platelet Confirmation CONFIRMED    EKG 12 Lead   Result Value Ref Range    Ventricular Rate 70 BPM    Atrial Rate 70 BPM    P-R Interval 182 ms    QRS Duration 124 ms    Q-T Interval 456 ms    QTc Calculation (Bazett) 492 ms    P Axis 68 degrees    R Axis -60 degrees    T Axis 82 degrees       Radiology  XR CHEST PORTABLE   Final Result      Cardiomegaly with increased interstitial markings bilaterally ? Interstitial fibrosis                   EKG:  This EKG is signed and interpreted by me. Rate: 70  Rhythm: Sinus  Interpretation: Sinus rhythm, LAD, age indeterminant infarct without acute ST changes  Comparison: changes compared to previous EKG    ------------------------- NURSING NOTES AND VITALS REVIEWED ---------------------------  Date / Time Roomed:  12/12/2018 10:51 AM  ED Bed Assignment:  10/10    The nursing notes within the ED encounter and vital signs as below have been reviewed. Patient Vitals for the past 24 hrs:   BP Temp Temp src Pulse Resp SpO2   12/12/18 1342 (!) 90/51 - - 95 18 97 %   12/12/18 1237 (!) 102/51 - - 73 18 97 %   12/12/18 1113 (!) 93/51 98.3 °F (36.8 °C) Oral 70 18 90 %       Oxygen Saturation Interpretation: Normal    ------------------------------------------ PROGRESS NOTES ------------------------------------------  Re-evaluation(s):  Patient reassessed, no acute complaints.  Discussed results and plan for admission, agreeable at this

## 2018-12-13 PROBLEM — J96.22 ACUTE ON CHRONIC RESPIRATORY FAILURE WITH HYPOXIA AND HYPERCAPNIA (HCC): Status: ACTIVE | Noted: 2018-01-01

## 2018-12-13 NOTE — PROGRESS NOTES
Spoke with Celestino Joya in Dr. Erlinda Fernandez office to make F/U apt for patient. She said she is unable to at this time.

## 2018-12-13 NOTE — PROGRESS NOTES
Call placed to answering service for Dr. Jose Arias, requesting them to page him again in regard to patient's critical ABG's. Awaiting call back.      Electronically signed by Charlie Linton RN on 12/13/2018 at 1:02 PM

## 2018-12-13 NOTE — PROGRESS NOTES
Message sent on perfect serve to Prentis Pleasure with wound care to ensure that they received the consult, per family's request and that they are expressing concern that no one has addressed the wound to the LLE. Electronically signed by Pancho Fish RN on 12/13/2018 at 2:35 PM    Wound care nurse responded back, stated that patient will be seen today.      Electronically signed by Pancho Fish RN on 12/13/2018 at 3:17 PM

## 2018-12-13 NOTE — PROGRESS NOTES
Physical Therapy    Facility/Department: [facility] MED SURG  Initial Assessment    NAME: Iglesia Márquez  : 1926  MRN: [id number]    Date of Service: 2018      Patient Diagnosis(es): The primary encounter diagnosis was Acute on chronic diastolic congestive heart failure ([address] Diagnoses of Bilateral lower extremity edema and Pulmonary hypertension ([address] were also pertinent to this visit. has a past medical history of (HFpEF) heart failure with preserved ejection fraction ([address] Abnormal carotid duplex scan; Acute on chronic respiratory failure with hypoxia and hypercapnia ([address] Aortic stenosis; CAD (coronary artery disease); Chronic lymphocytic leukemia (CLL), B-cell ([address] Chronic lymphocytic leukemia ([address] COPD (chronic obstructive pulmonary disease) ([address] Diabetes ([address] Dyspnea; Interstitial lung disease ([address] Mitral regurgitation; Myelodysplastic syndrome ([address] Osteoarthritis; Pulmonary hypertension ([address] syncope with collaspe; Thalassemia trait; Thrombocytopenia ([address] and Tricuspid regurgitation. has no past surgical history on file. Evaluating Therapist: Nixon Dee PT       Room #:  608   DIAGNOSIS:  Acute on chronic respiratory failure with hypoxia   PRECAUTIONS: Narinder [email or web address]     Social:  Pt lives with  Spouse  in a  1  floor plan  1  steps and  1 rails to enter. Prior to admission pt walked with rollator. Has home O2      Initial Evaluation  Date:  18 Treatment      Short Term/ Long Term   Goals   Was pt agreeable to Eval/treatment?   yes      Does pt have pain?  none reported      Bed Mobility  Rolling:  SBA   Supine to sit:  SBA   Sit to supine:  NT   Scooting:  SBA    independent    Transfers Sit to stand:  SBA   Stand to sit:  SBA   Stand pivot:  SBA    independent    Ambulation     15 feet x 1 and 60 feet x 1  with ww  with  SBA    150 feet with  ww  with  independen        Stair negotiation: ascended and descended NT    1-4  steps with  1 rail with  SBA Finn Acron    LE

## 2018-12-13 NOTE — CONSULTS
from TAVR and no procedures were offered to the patient. History reviewed. No pertinent surgical history. Family History   Problem Relation Age of Onset    Cancer Mother     Diabetes Mother     Diabetes Father        Prior to Admission medications    Medication Sig Start Date End Date Taking? Authorizing Provider   glipiZIDE (GLUCOTROL) 5 MG tablet Take 2.5 mg by mouth every evening   Yes Historical Provider, MD   potassium chloride (MICRO-K) 10 MEQ extended release capsule Take 1 capsule by mouth daily  Patient taking differently: Take 10 mEq by mouth every morning  3/27/18  Yes Yaneth Yip MD   furosemide (LASIX) 40 MG tablet Take 1 tablet by mouth 2 times daily 3/27/18  Yes Yaneth Yip MD   glipiZIDE (GLUCOTROL) 5 MG tablet 1 tablet every morning 1/2 every evening  Patient taking differently: Take 5 mg by mouth every morning  3/27/18  Yes Yaneth Yip MD   metoprolol tartrate (LOPRESSOR) 25 MG tablet Take 0.5 tablets by mouth 2 times daily 3/27/18  Yes Yaneth Yip MD   glucose (GLUTOSE) 40 % GEL Take 15 g by mouth as needed (hypoglycemia) 7/8/16  Yes Yaneth Yip MD   OXYGEN Inhale 3 L into the lungs continuous Patient has been on Oxygen since October 2014   Yes Historical Provider, MD   aspirin 81 MG tablet Take 81 mg by mouth daily as needed for Pain    Yes Historical Provider, MD   glucose blood VI test strips (ASCENSIA AUTODISC VI;ONE TOUCH ULTRA TEST VI) strip Inject 1 each into the skin daily As needed. 2/26/18   Yaneth Yip MD   Blood Glucose Monitoring Suppl (D-CARE GLUCOMETER) w/Device KIT ONE GLUCOMETER FREE STYLE 2/26/18   Yaneth Yip MD        Allergies: Patient has no known allergies.     Social History   Substance Use Topics    Smoking status: Never Smoker    Smokeless tobacco: Never Used      Comment: exposed to 2nd hand smoke     Alcohol use No          Review of Systems:  HEENT: negative for acute visual and auditory problems  Constitutional: cm²/m²) Dimensionless index =0.21. Peak gradient = 77 mmHg, mean gradient =45 mmHg  dimensionless  index =0.21.  RVSP 83 mmHg             Summary of pertinent history:  1 Admitted 12/12/18 with dyspnea / edema lJEC=[id number] bun= 23 creatinine= 0.8 troponin WNL H/H=8.9/32  2 Severe AS  3 Chronic interstitial lung disease  4 DM  5  Pulmonary hypertension severe  6 Thalassemia trait with anemia. Chronic lymphocytic leukemia    Assessment:   1 ChronicCHF secondary to severe AS and biventricular failure  2 Pulmonary hypertension severe. fixed  3 COPD /interstial fibrosis  4 Anemia chronic with CL/Lthalessemia,thrombocytopenia    Recommend:  1 Telemetry  2 Dyspnea multifactorial >cautious diuretic use  with close attention to BP and orthostasis  3 No cardiac imaging          Cass Meyer MD

## 2018-12-13 NOTE — PLAN OF CARE
Problem: Falls - Risk of:  Goal: Will remain free from falls  Will remain free from falls   Outcome: Met This Shift      Problem: Skin Integrity/Risk  Goal: No skin breakdown during hospitalization  Outcome: Met This Shift

## 2018-12-13 NOTE — PROGRESS NOTES
MCHC  27.1*  27.0*   RDW  16.3*  16.3*   LYMPHOPCT  31.4  39.1   MONOPCT  5.6  7.1   BASOPCT  0.2  0.3   MONOSABS  0.45  0.45   LYMPHSABS  2.53  2.47   EOSABS  0.12  0.11   BASOSABS  0.02  0.02          H & H :  Recent Labs      12/12/18   1201  12/13/18   0440   HGB  8.9*  8.3*       TSH:  Recent Labs      12/13/18   0440   TSH  2.240       GLUCOSE:No results for input(s): POCGLU in the last 72 hours. CMP:  Recent Labs      12/12/18   1201  12/13/18   0440   NA  140  142   K  4.7  4.3   CL  96*  95*   CO2  41*  40*   BUN  23  26*   CREATININE  0.8  0.9   GFRAA  >60  >60   LABGLOM  >60  59   GLUCOSE  54*  61*   PROT  6.7  6.1*   LABALBU  4.0  3.5   CALCIUM  8.7  8.0*   BILITOT  0.7  0.5   ALKPHOS  65  56   AST  24  15   ALT  9  8        BNP:No results found for: BNP    PROTIME/INR:No results for input(s): PROTIME, INR in the last 72 hours. CRP: No results for input(s): CRP in the last 72 hours. ESR:No results for input(s): SEDRATE in the last 72 hours. LIPASE , AMYLASE:  Lab Results   Component Value Date    LIPASE 108 (H) 06/22/2016      No results found for: AMYLASE    ABGs:   Lab Results   Component Value Date    PH 7.515 07/02/2016    PO2 79.7 07/02/2016    PCO2 43.0 07/02/2016       CARDIAC: Recent Labs      12/12/18   1201  12/12/18   1710  12/13/18   0440   TROPONINI  <0.01  <0.01  <0.01       Lipid Profile: Lab Results   Component Value Date    TRIG 66 12/13/2018    HDL 40 12/13/2018    LDLCALC 80 12/13/2018    CHOL 133 12/13/2018        Lab Results   Component Value Date    LABA1C 5.0 12/12/2018            RADIOLOGY: REVIEWED AVAILABLE REPORT  XR CHEST PORTABLE   Final Result      Cardiomegaly with increased interstitial markings bilaterally ?    Interstitial fibrosis                         Susanne Pallas Mihok  DO   7:02 AM     12/13/2018      Voice recognition software used for dictation

## 2018-12-13 NOTE — PROGRESS NOTES
Titi Mcdermott,    Your patient is on a medication that requires a renal dose adjustment. Renal Function Assessment:    Date Body Weight IBW Adj. Body Weight Scr CrCl Dialysis status   12/13/2018 46.3 kg   0.9 29 ml/min no       Pharmacy has renally dose-adjusted the following medication(s):    Date Medication Original Dosing Regimen New Dosing Regimen   12/13/2018 Lovenox 40 mg sc daily 30 mg sc daily           These changes were made per protocol according to the [facility] Renal Function-Based Dose Adjustments Policy    *Please note this dose may need readjusted if your patient's renal function significantly improves. Please contact pharmacy with any questions regarding these changes. Thank Nahum Mota Pharm. D 12/13/2018 7:41 AM 05-Oct-2017

## 2018-12-13 NOTE — CONSULTS
determined    Referrals to: none today    Discussed patient and the plan of care with the other IDT members of Palliative Care, and with patient and floor nurse. Current Medications:  Inpatient medications reviewed: yes  Home Medications reviewed: yes    Subjective:   Hospital days prior to  consult: Hospital Day: 2    Subjective/Events  This very pleasant 80-year-old  female with a significant past medical history of COPD and pulmonary fibrosis/oxygen dependent, chronic lymphocytic leukemia, diabetes, chronic diastolic heart failure, severe aortic stenosis and was refused av TAVR 2 years ago (due to her health and risk of the procedure), presented to the hospital emergency department with increasing shortness of breath and leg swelling. She also complains of a skin abrasion/tear to her left mid shin as well as redness to both lower extremities with edema. She stated that she had worse edema in the past. She is currently denying any headache or blurred vision, ear pain or sore throat, neck or back pain, chest pain or palpitations, abdominal pain, nausea or vomiting, diarrhea, hematemesis, hematochezia, melena, or other extremity discomfort. She denies any fever or chills. She denies any productive cough. She is able to perform all ADLs at home. She has a maid at home to help her on a daily basis. Goals of care: live longer, improve or maintain function/quality of life, provide comfort care/support/palliation/relieve suffering, remain at home and continue current management  Advance Directives: DNR-CCA/DNI.   Surrogate:Child and HCPOA  Prognosis: unknown  Spiritual assessment: No spiritual distress identified   Bereavement and grief: to be determined    Past Medical History:   Diagnosis Date    (HFpEF) heart failure with preserved ejection fraction [address] 06/24/2016    3/31/16- echocardiogram- LVEF 60-65%, stage I DD, moderate mitral annular calfication, moderate mitral stenosis, mild MR, mild TR,

## 2018-12-14 PROBLEM — E44.0 MODERATE PROTEIN-CALORIE MALNUTRITION (HCC): Chronic | Status: ACTIVE | Noted: 2018-01-01

## 2018-12-14 NOTE — PROGRESS NOTES
biventricular failure  2 Pulmonary hypertension severe. fixed  3 COPD /interstial fibrosis  4 Anemia chronic with CL/Lthalessemia,thrombocytopenia     Recommend:  1 Telemetry  2 Dyspnea multifactorial >cautious diuretic use  with close attention to BP and orthostasis  3 No cardiac imaging recommended  4 OK for discharge from a cardiac standpoint            Aileen Torres, [facility] Cardiology        Lab Results   Component Value Date     12/13/2018     12/12/2018     10/03/2018    K 4.3 12/13/2018    K 4.7 12/12/2018    K 5.2 (H) 10/03/2018    CL 95 (L) 12/13/2018    CL 96 (L) 12/12/2018    CL 93 (L) 10/03/2018    CO2 40 (H) 12/13/2018    CO2 41 (HH) 12/12/2018    CO2 34 (H) 10/03/2018    BUN 26 (H) 12/13/2018    BUN 23 12/12/2018    BUN 27 (H) 10/03/2018    CREATININE 0.9 12/13/2018    CREATININE 0.8 12/12/2018    CREATININE 1.0 10/03/2018    GLUCOSE 61 (L) 12/13/2018    GLUCOSE 54 (L) 12/12/2018    GLUCOSE 288 (H) 10/03/2018    CALCIUM 8.0 (L) 12/13/2018    CALCIUM 8.7 12/12/2018    CALCIUM 8.7 10/03/2018

## 2018-12-14 NOTE — PROGRESS NOTES
AC    potassium chloride  10 mEq Oral Daily with breakfast    aspirin  81 mg Oral Daily       Diet:   DIET NO SALT ADDED (3-4 GM); EXAM:  General: No distress. Alert. Eyes: PERRL. No sclera icterus. No conjunctival injection. ENT: No discharge. Pharynx clear. Neck: Trachea midline. Normal thyroid. Resp: No accessory muscle use. Bibasilar rales. No wheezing. No rhonchi. CV: Regular rate. Regular rhythm. 4/6 SE murmur or rub. Abd: Non-tender. Non-distended. No masses. No organomegaly. Normal bowel sounds. Skin: Warm and dry. No nodule on exposed extremities. No rash on exposed extremities. Ext: No cyanosis, clubbing, edema  Lymph: No cervical LAD. No supraclavicular LAD. M/S: No cyanosis. No joint deformity. No clubbing. Neuro: Awake. Follows commands. Positive pupils/gag/corneals. Normal pain response. Results:  CBC: Recent Labs      12/12/18   1201  12/13/18   0440   WBC  8.1  6.3   HGB  8.9*  8.3*   HCT  32.9*  30.7*   MCV  69.1*  69.0*   PLT  77*  72*     BMP: Recent Labs      12/12/18   1201  12/12/18   1710  12/13/18   0440   NA  140   --   142   K  4.7   --   4.3   CL  96*   --   95*   CO2  41*   --   40*   PHOS   --   3.1   --    BUN  23   --   26*   CREATININE  0.8   --   0.9     LIVER PROFILE:   Recent Labs      12/12/18   1201  12/13/18   0440   AST  24  15   ALT  9  8   BILITOT  0.7  0.5   ALKPHOS  65  56     PT/INR: No results for input(s): PROTIME, INR in the last 72 hours. APTT: No results for input(s): APTT in the last 72 hours. Pathology:  1. N/A      Microbiology:  1. None    Recent ABG:   Recent Labs      12/13/18   1207   PH  7.340*   PO2  57.7*   PCO2  76.6*   HCO3  40.4*   BE  12.3*   O2SAT  88.8*   METHB  0.2   O2HB  86.9*   COHB  1.9*   O2CON  12.4   HHB  11.0*   THB  10.1*             Recent Films:  XR CHEST PORTABLE   Final Result      Cardiomegaly with increased interstitial markings bilaterally ?    Interstitial fibrosis

## 2018-12-14 NOTE — PROGRESS NOTES
Nutrition Assessment    Type and Reason for Visit: Initial, Positive Nutrition Screen, Consult, Patient Education (wounds, CHF Diet Edu)    Nutrition Recommendations: Continue Diet. Start Ensure High Calorie Supplement BID. Nutrition Education:  Cardiac Diet Education completed w/ pt and  at bedside. Nutrition Assessment: Pt moderately malnourished AEB moderate muscle and fat loss d/t decreased intake and metabolic demand for ILD/CLL/MDS. At risk for further compromise d/t continued decreased intake and metabolic demand for wound healing. Will Start Ensure High Calorie BID. Malnutrition Assessment:  · Malnutrition Status: Meets the criteria for moderate malnutrition  · Context: Chronic illness  · Findings of the 6 clinical characteristics of malnutrition (Minimum of 2 out of 6 clinical characteristics is required to make the diagnosis of moderate or severe Protein Calorie Malnutrition based on AND/ASPEN Guidelines):  1. Energy Intake-Less than 75% of estimated energy requirement for greater than or equal to 1 month, greater than or equal to 1 month    2. Weight Loss-No significant weight loss    3. Fat Loss-Moderate subcutaneous fat loss, Orbital, Triceps  4. Muscle Loss-Moderate muscle mass loss, Temples (temporalis muscle), Clavicles (pectoralis and deltoids), Scapula (trapezius)  5. Fluid Accumulation-Moderate to severe fluid accumulation, Extremities  6.  Strength-Not measured    Nutrition Risk Level: Moderate    Nutrient Needs:  · Estimated Daily Total Kcal: 2412-4379 (30-35 kcals/kg CBW)  · Estimated Daily Protein (g): 70-85 (1.5-1.8 gm/kg CBW)  · Estimated Daily Total Fluid (ml/day): 0364-9664 (1 ml/kcal CBW)    Nutrition Diagnosis:   · Problem:  Moderate malnutrition, In context of chronic illness  · Etiology: related to Insufficient energy/nutrient consumption, Catabolic illness     Signs and symptoms:  as evidenced by Intake 50-75%, Diet history of poor intake, Presence of

## 2018-12-14 NOTE — PROGRESS NOTES
PROGRESS  NOTE --                                                          INTERNAL  MEDICINE                                                                              I  PERSONALLY SAW , EXAMINED, AND CARED [facility], 12/14/2018     LABS, XRAY ,CHART, AND MEDICATIONS  REVIEWED BY ME .        12/13/18-SUBJECTIVE: Brendan Spencer is alert awake and cooperative; oriented ×3. Denies any chest pain  nausea emesis. Tolerating diet. No abdominal pain. Her only complaint, she wants to be discharged. Specifically no chest pains or dyspnea. Intake and outputs likely unreliable, +150 cc. Sodium 142 potassium 4.3 chloride 95 CO2 40 BUN 26 creatinine 0.9 magnesium 2.4 glucose 61 calcium 8.0 protein 6.1 and uric acid 8.2 pro BNP 3550. LDL 80. Liver functions normal.  TSH 2.240. A1c 5.0. Hemoglobin 8.3 bili BBC 6.3 platelets 27,611. Viral respiratory panel negative; blood cultures pending. Urine culture greater than 100,000 gram-negative rods, sensitivities pending. Patient has been seen by cardiology; no further cardiac testing planned. Also seen by pulmonary; ABGs pending. Patient seen by palliative medicine all consult appreciated. 12/14/18-patient sitting in bedside chair. Denies any dyspnea chest pains. 2-D echo remains pending. Patient requesting discharge home. She has been seen by wound care. Viral respiratory panel, blood cultures remain negative. Urine culture with greater than 100,000 gram-negative rods. ABGs done yesterday revealed significant hypercapnia with a PCO2 of 76.6 PO2 of 57.7 and a pH of 7.34. She has been seen by pulmonary as well as cardiology. All have cleared her for discharge at this time. 2-D echo is still pending at time of discharge      ROS:  Negative to a 10 system review except that mentioned in the HPI.           Objective:     PHYSICAL EXAM:    VS: BP (!) 101/50   Pulse 86   Temp 97.9 °F 10 mEq Oral Daily with breakfast    aspirin  81 mg Oral Daily       Continuous Infusions:   dextrose           Data:   Temperature:  Current - Temp: 97.9 °F (36.6 °C); Max - Temp  Av °F (36.7 °C)  Min: 97.6 °F (36.4 °C)  Max: 98.5 °F (36.9 °C)    Respiratory Rate : Resp  Av.7  Min: 16  Max: 18    Pulse Range: Pulse  Av  Min: 70  Max: 93    Blood Presuure Range:  Systolic (74ZDB), ORN:211 , Min:101 , RMD:670   ; Diastolic (38NZA), HIL:67, Min:50, Max:63      Pulse ox Range: SpO2  Av.6 %  Min: 90 %  Max: 97 %    Patient Vitals for the past 8 hrs:   SpO2   18 0100 97 %         Intake/Output Summary (Last 24 hours) at 18 0622  Last data filed at 18 1230   Gross per 24 hour   Intake              540 ml   Output                0 ml   Net              540 ml       I/O last 3 completed shifts: In: 5 [P.O.:540]  Out: 150 [Urine:150]    No intake/output data recorded.     Wt Readings from Last 3 Encounters:   18 102 lb (46.3 kg)   18 97 lb (44 kg)   18 99 lb (44.9 kg)       Labs:   CBC:   Lab Results   Component Value Date    WBC 6.3 2018    RBC 4.45 2018    HGB 8.3 2018    HCT 30.7 2018    MCV 69.0 2018    MCH 18.7 2018    MCHC 27.0 2018    RDW 16.3 2018    PLT 72 2018    MPV 10.2 2018     CBC with Differential:    Lab Results   Component Value Date    WBC 6.3 2018    RBC 4.45 2018    HGB 8.3 2018    HCT 30.7 2018    PLT 72 2018    MCV 69.0 2018    MCH 18.7 2018    MCHC 27.0 2018    RDW 16.3 2018    BANDSPCT 1 2016    LYMPHOPCT 39.1 2018    MONOPCT 7.1 2018    BASOPCT 0.3 2018    MONOSABS 0.45 2018    LYMPHSABS 2.47 2018    EOSABS 0.11 2018    BASOSABS 0.02 2018     Hemoglobin/Hematocrit:    Lab Results   Component Value Date    HGB 8.3 2018    HCT 30.7 2018     CMP:    Lab Results   Component

## 2018-12-16 LAB
EKG ATRIAL RATE: 70 BPM
EKG P AXIS: 68 DEGREES
EKG P-R INTERVAL: 182 MS
EKG Q-T INTERVAL: 456 MS
EKG QRS DURATION: 124 MS
EKG QTC CALCULATION (BAZETT): 492 MS
EKG R AXIS: -60 DEGREES
EKG T AXIS: 82 DEGREES
EKG VENTRICULAR RATE: 70 BPM

## 2018-12-16 NOTE — DISCHARGE SUMMARY
DISCHARGE SUMMARY  Patient ID:  Jenni Aguilera  [id number]  2 y.o.  12/4/1926    Admit date: 12/12/2018      Discharge date and time: 12/14/2018  6:47 PM    Admission Diagnoses: Acute on chronic respiratory failure with hypoxia ([address] [J96.21]  Acute on chronic respiratory failure with hypoxia ([address] [J96.21]    Discharge Diagnosis  Principal Problem:    Acute on chronic respiratory failure with hypoxia and hypercapnia (HCC)  Active Problems: Aortic stenosis    Acute on chronic diastolic congestive heart failure (HCC)    Thrombocytopenia (HCC)    Myelodysplastic syndrome (HCC)    Diabetes (HCC)    Chronic lymphocytic leukemia (HCC)    Interstitial lung disease (HCC)    Bilateral lower extremity edema    Goals of care, counseling/discussion    Moderate protein-calorie malnutrition ([address]  Resolved Problems:    * No resolved hospital problems. *      Hospital Course: CHIEF COMPLAINT: Worsening leg edema, dyspnea       History of Present Illness: 61-year-old female patient of Dr. Andria Downs I am asked to admit and follow. History is obtained from the patient's  as well as son. Over the last 6-10 days patient has been having increasing edema of her legs. She has interstitial lung disease, severe aortic valvular stenosis and chronic diastolic CHF. She denies any cough fever chills chest pains or syncope. She also recently developed a small skin tear over the distal left tibia with mild weeping. She is on chronic oxygen at home. She was supposed to follow-up with cardiology but has canceled the appointments. She was seen 2016 both here in [facility] clinic for possible TAVR; due to multiple comorbidities that was deemed inappropriate. Chest x-ray in the ED showed cardiomegaly with increased interstitial markings. --Sodium 140 potassium 4.7 chloride 96 CO2 41 BUN 23 creatinine 0.8 lactic acid 0.7 glucose 54 calcium 8.7 protein 6.7 proBNP 2755. Troponin less than 0.01.  Liver functions normal. WBC 8.1 hemoglobin 8.9 platelets 61,555. Urinalysis negative. 12/13/18-SUBJECTIVE: Edi Ralphs is alert awake and cooperative; oriented ×3. Denies any chest pain  nausea emesis. Tolerating diet. No abdominal pain. Her only complaint, she wants to be discharged. Specifically no chest pains or dyspnea. Intake and outputs likely unreliable, +150 cc. Sodium 142 potassium 4.3 chloride 95 CO2 40 BUN 26 creatinine 0.9 magnesium 2.4 glucose 61 calcium 8.0 protein 6.1 and uric acid 8.2 pro BNP 3550. LDL 80. Liver functions normal.  TSH 2.240. A1c 5.0. Hemoglobin 8.3 bili BBC 6.3 platelets 56,157. Viral respiratory panel negative; blood cultures pending. Urine culture greater than 100,000 gram-negative rods, sensitivities pending. Patient has been seen by cardiology; no further cardiac testing planned. Also seen by pulmonary; ABGs pending. Patient seen by palliative medicine all consult appreciated.     12/14/18-patient sitting in bedside chair. Denies any dyspnea chest pains. 2-D echo remains pending. Patient requesting discharge home. She has been seen by wound care. Viral respiratory panel, blood cultures remain negative. Urine culture with greater than 100,000 gram-negative rods. ABGs done yesterday revealed significant hypercapnia with a PCO2 of 76.6 PO2 of 57.7 and a pH of 7.34. She has been seen by pulmonary as well as cardiology. All have cleared her for discharge at this time.  2-D echo is still pending at time of discharge    Add Keflex 250 mg 3 times a day, 10 days  Bumex 1 mg by mouth daily  Aldactone 12.5 mg by mouth daily  Follow-up Dr. Vidhi Gill one week  Follow-up cardiology and pulmonary per their request  Patient agrees to home healthcare    Condition at DISCHARGE : [address]     Discharge Instructions: Medications reviewed with patient    Consults:cardiology, pulmonary/intensive care and PALLIATIVE; CHF CLINIC     Past Medical Hx :   Past Medical History:   Diagnosis Date    (HFpEF) heart failure with preserved ejection fraction ([facility] 06/24/2016    3/31/16- echocardiogram- LVEF 60-65%, stage I DD, moderate mitral annular calfication, moderate mitral stenosis, mild MR, mild TR, mild-moderate pulmonary hypertension, severe aortic stenosis    Abnormal carotid duplex scan     05/08/2014 No hemodynamic significant stenosis    Acute on chronic respiratory failure with hypoxia and hypercapnia ([facility]) 12/12/2018    Aortic stenosis     severe, critical aortic stenosis with associated left ventricular dysfunction    CAD (coronary artery disease)     Chronic lymphocytic leukemia (CLL), B-cell (HCC)     Chronic lymphocytic leukemia ([facility] 12/12/2018    COPD (chronic obstructive pulmonary disease) ([facility])     Diabetes ([facility])     Type II    Dyspnea     Interstitial lung disease ([address] 12/12/2018    Mitral regurgitation     Myelodysplastic syndrome ([address] 12/12/2018    Osteoarthritis     Pulmonary hypertension ([address]     05/09/14 with Right ventriuclar presssure of 55-60 mm/hg    syncope with collaspe     prior to 05/07/2014 mild forehead laceration     Thalassemia trait     with leukemia    Thrombocytopenia ([address] 12/12/2018    Tricuspid regurgitation        Past Surgical Hx :History reviewed. No pertinent surgical history.     Labs:   CBC:   Lab Results   Component Value Date    WBC 6.3 12/13/2018    RBC 4.45 12/13/2018    HGB 8.3 12/13/2018    HCT 30.7 12/13/2018    MCV 69.0 12/13/2018    MCH 18.7 12/13/2018    MCHC 27.0 12/13/2018    RDW 16.3 12/13/2018    PLT 72 12/13/2018    MPV 10.2 12/13/2018     CBC with Differential:    Lab Results   Component Value Date    WBC 6.3 12/13/2018    RBC 4.45 12/13/2018    HGB 8.3 12/13/2018    HCT 30.7 12/13/2018    PLT 72 12/13/2018    MCV 69.0 12/13/2018    MCH 18.7 12/13/2018    MCHC 27.0 12/13/2018    RDW 16.3 12/13/2018    BANDSPCT 1 08/04/2016    LYMPHOPCT 39.1 12/13/2018    MONOPCT 7.1 12/13/2018    BASOPCT 0.3 12/13/2018    MONOSABS 0.45 12/13/2018    LYMPHSABS 2.47 12/13/2018    EOSABS 0.11 12/13/2018    BASOSABS 0.02 12/13/2018     Hemoglobin/Hematocrit:    Lab Results   Component Value Date    HGB 8.3 12/13/2018    HCT 30.7 12/13/2018     CMP:    Lab Results   Component Value Date     12/13/2018    K 4.3 12/13/2018    CL 95 12/13/2018    CO2 40 12/13/2018    BUN 26 12/13/2018    CREATININE 0.9 12/13/2018    GFRAA >60 12/13/2018    LABGLOM 59 12/13/2018    GLUCOSE 61 12/13/2018    PROT 6.1 12/13/2018    LABALBU 3.5 12/13/2018    CALCIUM 8.0 12/13/2018    BILITOT 0.5 12/13/2018    ALKPHOS 56 12/13/2018    AST 15 12/13/2018    ALT 8 12/13/2018     BMP:    Lab Results   Component Value Date     12/13/2018    K 4.3 12/13/2018    CL 95 12/13/2018    CO2 40 12/13/2018    BUN 26 12/13/2018    LABALBU 3.5 12/13/2018    CREATININE 0.9 12/13/2018    CALCIUM 8.0 12/13/2018    GFRAA >60 12/13/2018    LABGLOM 59 12/13/2018    GLUCOSE 61 12/13/2018     Hepatic Function Panel:    Lab Results   Component Value Date    ALKPHOS 56 12/13/2018    ALT 8 12/13/2018    AST 15 12/13/2018    PROT 6.1 12/13/2018    BILITOT 0.5 12/13/2018    LABALBU 3.5 12/13/2018     Magnesium:    Lab Results   Component Value Date    MG 2.4 12/13/2018     Phosphorus:    Lab Results   Component Value Date    PHOS 3.1 12/12/2018     LDH:    Lab Results   Component Value Date     06/29/2016     Uric Acid:    Lab Results   Component Value Date    LABURIC 8.2 12/13/2018     PT/INR:    Lab Results   Component Value Date    PROTIME 12.1 07/24/2016    INR 1.1 07/24/2016     Warfarin PT/INR:  No components found for: PTPATWAR, PTINRWAR  PTT:    Lab Results   Component Value Date    APTT 28.1 07/24/2016   [APTT}  Troponin:    Lab Results   Component Value Date    TROPONINI <0.01 12/13/2018     Last 3 Troponin:    Lab Results   Component Value Date    TROPONINI <0.01 12/13/2018    TROPONINI <0.01 12/12/2018    TROPONINI <0.01 12/12/2018     U/A:    Lab Results   Component Value Date    COLORU Straw 12/12/2018

## 2018-12-17 LAB
BLOOD CULTURE, ROUTINE: NORMAL
CULTURE, BLOOD 2: NORMAL

## 2018-12-18 ENCOUNTER — CARE COORDINATION (OUTPATIENT)
Dept: CARE COORDINATION | Age: 83
End: 2018-12-18